# Patient Record
Sex: FEMALE | Race: BLACK OR AFRICAN AMERICAN | NOT HISPANIC OR LATINO | URBAN - METROPOLITAN AREA
[De-identification: names, ages, dates, MRNs, and addresses within clinical notes are randomized per-mention and may not be internally consistent; named-entity substitution may affect disease eponyms.]

---

## 2017-10-15 ENCOUNTER — EMERGENCY (EMERGENCY)
Facility: HOSPITAL | Age: 22
LOS: 1 days | Discharge: HOME | End: 2017-10-15
Attending: EMERGENCY MEDICINE | Admitting: EMERGENCY MEDICINE
Payer: COMMERCIAL

## 2017-10-15 VITALS
HEART RATE: 95 BPM | WEIGHT: 149.91 LBS | SYSTOLIC BLOOD PRESSURE: 117 MMHG | TEMPERATURE: 99 F | RESPIRATION RATE: 14 BRPM | OXYGEN SATURATION: 100 % | HEIGHT: 65 IN | DIASTOLIC BLOOD PRESSURE: 85 MMHG

## 2017-10-15 PROCEDURE — 99283 EMERGENCY DEPT VISIT LOW MDM: CPT

## 2017-10-15 PROCEDURE — 73630 X-RAY EXAM OF FOOT: CPT | Mod: 26,RT
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
## 2017-10-15 NOTE — ED PROVIDER NOTE - MUSCULOSKELETAL, MLM
RLE - No bony tenderness.  No achilles tendon tenderness.  Normal DP and PT pulses.  Normal plantarflexion and dorsiflexion.  Sensation of 1st webspace intact

## 2017-10-15 NOTE — ED PROVIDER NOTE - MEDICAL DECISION MAKING DETAILS
No evidence of fracture on plain films.  Exam without ecchymosis or edema.  RICE instructions, ace wrap, OTC pain medication as needed
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
## 2017-10-19 DIAGNOSIS — S90.31XA CONTUSION OF RIGHT FOOT, INITIAL ENCOUNTER: ICD-10-CM

## 2017-10-19 DIAGNOSIS — M79.671 PAIN IN RIGHT FOOT: ICD-10-CM
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
## 2019-10-11 NOTE — ED ADULT TRIAGE NOTE - WEIGHT IN LBS
From: Garima Early  To: Ban Martinez MD  Sent: 10/11/2019 2:25 PM CDT  Subject: Medication Question    This message is being sent by Thi Early on behalf of Garima Alcantara!! Was thinking you might be in the office if it's Friday afternoon and I could catch you:)     [facility] is charging us 80$ for Claires Novolog, which is two of the 40$ copays. I called our insurance and asked why they are charging us double and they said because the perscription is for a 40 day supply so they have to charge us for two months:( Is there anyway you can re-do her Novolog script to say 1 bottle every 28 days so we dont have to pay double anymore? Right now her script says 25 units/day so they are saying the bottle should last 40 days, but we have to throw it out after 28. She is actually only using 3 units on average a day! If you are able to switch the script, could you please let me know, we would love to save that 40$ a month x 2 (her bottle for school!) so we pay 80$ instead of 160$ ! thanks so so much!!   149.9

## 2020-06-11 NOTE — ED ADULT NURSE NOTE - LOCATION
[facility]     Pt finished the first set of prednisone and didn't feel it helped at all like it use to. She said  called a second round of prednisone in but she doesn't know if she should even pick it up or if she does how long would she need to wait to start taking it. She said Dr. Christen Chen offered a higher dose of prednisone but she turned it down but feels maybe she should try it because she is not getting any relief from what she's taken.  Please advise
foot

## 2021-11-11 NOTE — ED ADULT TRIAGE NOTE - HEIGHT IN CM
2020   Works at [employer] in [employer] [place]    Sherin Peters MD, MPH  [facility]      165.1